# Patient Record
Sex: FEMALE | Race: WHITE
[De-identification: names, ages, dates, MRNs, and addresses within clinical notes are randomized per-mention and may not be internally consistent; named-entity substitution may affect disease eponyms.]

---

## 2019-07-15 ENCOUNTER — HOSPITAL ENCOUNTER (EMERGENCY)
Dept: HOSPITAL 74 - JER | Age: 59
Discharge: LEFT BEFORE DISCHARGE | End: 2019-07-15
Payer: COMMERCIAL

## 2019-07-15 VITALS — TEMPERATURE: 98.2 F | HEART RATE: 93 BPM | DIASTOLIC BLOOD PRESSURE: 61 MMHG | SYSTOLIC BLOOD PRESSURE: 122 MMHG

## 2019-07-15 VITALS — BODY MASS INDEX: 39.9 KG/M2

## 2019-07-15 DIAGNOSIS — R07.89: Primary | ICD-10-CM

## 2019-07-15 NOTE — PDOC
Rapid Medical Evaluation


Chief Complaint: Chest Pain


Time Seen by Provider: 07/15/19 17:51


Medical Evaluation: 





07/15/19 17:53


I have performed a brief in-person evaluation of this patient.





The patient presents with a chief complaint of: no Sig PMhx present with 

complains of 2 days h/o chest tightness.  report mid-sternal CP only with deep 

breathing. Denies CP now, SOB, palpitation, N/V, fever, chills, sweats





Pertinent physical exam findings: A&O x 3 in NAD. heart RRR. lungs CTAB





I have ordered the following: EKG, cbc, cmp, cardiac profile CXR





The patient will proceed to the ED for further evaluation





**Discharge Disposition





- Diagnosis


 Tightness in chest








- Discharge Dispostion


Condition at time of disposition: Stable





- Referrals





- Patient Instructions





- Post Discharge Activity

## 2019-07-17 NOTE — EKG
Test Reason : 

Blood Pressure : ***/*** mmHG

Vent. Rate : 093 BPM     Atrial Rate : 093 BPM

   P-R Int : 156 ms          QRS Dur : 070 ms

    QT Int : 350 ms       P-R-T Axes : 055 -03 037 degrees

   QTc Int : 435 ms

 

NORMAL SINUS RHYTHM

NORMAL ECG

NO PREVIOUS ECGS AVAILABLE

Confirmed by ALFONSO STAPLETON, ASHIA ([id number]) on 7/17/2019 10:29:44 AM

 

Referred By:             Confirmed By:ASHIA HAMILTON MD